# Patient Record
Sex: FEMALE | Race: BLACK OR AFRICAN AMERICAN | Employment: FULL TIME | URBAN - METROPOLITAN AREA
[De-identification: names, ages, dates, MRNs, and addresses within clinical notes are randomized per-mention and may not be internally consistent; named-entity substitution may affect disease eponyms.]

---

## 2023-08-26 LAB — URINE CULTURE: NORMAL

## 2023-09-01 LAB
ALANINE AMINOTRANSFERASE (SGPT) (U/L) IN SER/PLAS: 20 U/L (ref 7–45)
ALBUMIN (G/DL) IN SER/PLAS: 4.4 G/DL (ref 3.4–5)
ALKALINE PHOSPHATASE (U/L) IN SER/PLAS: 63 U/L (ref 33–136)
ANION GAP IN SER/PLAS: 14 MMOL/L (ref 10–20)
ASPARTATE AMINOTRANSFERASE (SGOT) (U/L) IN SER/PLAS: 19 U/L (ref 9–39)
BASOPHILS (10*3/UL) IN BLOOD BY AUTOMATED COUNT: 0.04 X10E9/L (ref 0–0.1)
BASOPHILS/100 LEUKOCYTES IN BLOOD BY AUTOMATED COUNT: 0.6 % (ref 0–2)
BILIRUBIN TOTAL (MG/DL) IN SER/PLAS: 0.7 MG/DL (ref 0–1.2)
CALCIDIOL (25 OH VITAMIN D3) (NG/ML) IN SER/PLAS: 82 NG/ML
CALCIUM (MG/DL) IN SER/PLAS: 10.1 MG/DL (ref 8.6–10.6)
CARBON DIOXIDE, TOTAL (MMOL/L) IN SER/PLAS: 25 MMOL/L (ref 21–32)
CHLORIDE (MMOL/L) IN SER/PLAS: 107 MMOL/L (ref 98–107)
CHOLESTEROL (MG/DL) IN SER/PLAS: 107 MG/DL (ref 0–199)
CHOLESTEROL IN HDL (MG/DL) IN SER/PLAS: 67.2 MG/DL
CHOLESTEROL/HDL RATIO: 1.6
COBALAMIN (VITAMIN B12) (PG/ML) IN SER/PLAS: 900 PG/ML (ref 211–911)
CREATININE (MG/DL) IN SER/PLAS: 1.03 MG/DL (ref 0.5–1.05)
EOSINOPHILS (10*3/UL) IN BLOOD BY AUTOMATED COUNT: 0.26 X10E9/L (ref 0–0.7)
EOSINOPHILS/100 LEUKOCYTES IN BLOOD BY AUTOMATED COUNT: 4.1 % (ref 0–6)
ERYTHROCYTE DISTRIBUTION WIDTH (RATIO) BY AUTOMATED COUNT: 11.5 % (ref 11.5–14.5)
ERYTHROCYTE MEAN CORPUSCULAR HEMOGLOBIN CONCENTRATION (G/DL) BY AUTOMATED: 33.2 G/DL (ref 32–36)
ERYTHROCYTE MEAN CORPUSCULAR VOLUME (FL) BY AUTOMATED COUNT: 92 FL (ref 80–100)
ERYTHROCYTES (10*6/UL) IN BLOOD BY AUTOMATED COUNT: 4.78 X10E12/L (ref 4–5.2)
GFR FEMALE: 61 ML/MIN/1.73M2
GLUCOSE (MG/DL) IN SER/PLAS: 88 MG/DL (ref 74–99)
HEMATOCRIT (%) IN BLOOD BY AUTOMATED COUNT: 44 % (ref 36–46)
HEMOGLOBIN (G/DL) IN BLOOD: 14.6 G/DL (ref 12–16)
IMMATURE GRANULOCYTES/100 LEUKOCYTES IN BLOOD BY AUTOMATED COUNT: 0.2 % (ref 0–0.9)
LDL: 24 MG/DL (ref 0–99)
LEUKOCYTES (10*3/UL) IN BLOOD BY AUTOMATED COUNT: 6.3 X10E9/L (ref 4.4–11.3)
LYMPHOCYTES (10*3/UL) IN BLOOD BY AUTOMATED COUNT: 2.26 X10E9/L (ref 1.2–4.8)
LYMPHOCYTES/100 LEUKOCYTES IN BLOOD BY AUTOMATED COUNT: 35.8 % (ref 13–44)
MONOCYTES (10*3/UL) IN BLOOD BY AUTOMATED COUNT: 0.34 X10E9/L (ref 0.1–1)
MONOCYTES/100 LEUKOCYTES IN BLOOD BY AUTOMATED COUNT: 5.4 % (ref 2–10)
NEUTROPHILS (10*3/UL) IN BLOOD BY AUTOMATED COUNT: 3.41 X10E9/L (ref 1.2–7.7)
NEUTROPHILS/100 LEUKOCYTES IN BLOOD BY AUTOMATED COUNT: 53.9 % (ref 40–80)
NRBC (PER 100 WBCS) BY AUTOMATED COUNT: 0 /100 WBC (ref 0–0)
PLATELETS (10*3/UL) IN BLOOD AUTOMATED COUNT: 257 X10E9/L (ref 150–450)
POTASSIUM (MMOL/L) IN SER/PLAS: 4 MMOL/L (ref 3.5–5.3)
PROTEIN TOTAL: 7.2 G/DL (ref 6.4–8.2)
SODIUM (MMOL/L) IN SER/PLAS: 142 MMOL/L (ref 136–145)
THYROTROPIN (MIU/L) IN SER/PLAS BY DETECTION LIMIT <= 0.05 MIU/L: 1.63 MIU/L (ref 0.44–3.98)
TRIGLYCERIDE (MG/DL) IN SER/PLAS: 79 MG/DL (ref 0–149)
UREA NITROGEN (MG/DL) IN SER/PLAS: 13 MG/DL (ref 6–23)
VLDL: 16 MG/DL (ref 0–40)

## 2023-09-06 LAB
CLUE CELLS: ABNORMAL
NUGENT SCORE: 4
URINE CULTURE: NORMAL
VAGINITIS-BV + YEAST INTERPRETATION: ABNORMAL
YEAST: ABNORMAL

## 2023-09-28 PROBLEM — H02.9 LESION OF LEFT EYELID: Status: ACTIVE | Noted: 2023-09-28

## 2023-09-28 PROBLEM — E55.9 VITAMIN D DEFICIENCY: Status: ACTIVE | Noted: 2023-09-28

## 2023-09-28 PROBLEM — R30.0 DYSURIA: Status: ACTIVE | Noted: 2023-09-28

## 2023-09-28 PROBLEM — R53.83 FATIGUE: Status: ACTIVE | Noted: 2023-09-28

## 2023-09-28 PROBLEM — R25.2 LEG CRAMPS: Status: ACTIVE | Noted: 2023-09-28

## 2023-09-28 PROBLEM — H52.4 MYOPIA OF BOTH EYES WITH ASTIGMATISM AND PRESBYOPIA: Status: ACTIVE | Noted: 2023-09-28

## 2023-09-28 PROBLEM — H43.813 PVD (POSTERIOR VITREOUS DETACHMENT), BOTH EYES: Status: ACTIVE | Noted: 2023-09-28

## 2023-09-28 PROBLEM — R94.31 ABNORMAL ECG: Status: ACTIVE | Noted: 2023-09-28

## 2023-09-28 PROBLEM — N95.1 VAGINAL DRYNESS, MENOPAUSAL: Status: ACTIVE | Noted: 2023-09-28

## 2023-09-28 PROBLEM — R35.0 FREQUENT URINATION: Status: ACTIVE | Noted: 2023-09-28

## 2023-09-28 PROBLEM — R20.0 NUMBNESS OF RIGHT FOOT: Status: ACTIVE | Noted: 2023-09-28

## 2023-09-28 PROBLEM — H25.811 COMBINED FORM OF AGE-RELATED CATARACT, RIGHT EYE: Status: ACTIVE | Noted: 2023-09-28

## 2023-09-28 PROBLEM — H52.13 MYOPIA OF BOTH EYES WITH ASTIGMATISM AND PRESBYOPIA: Status: ACTIVE | Noted: 2023-09-28

## 2023-09-28 PROBLEM — H25.812 COMBINED FORM OF AGE-RELATED CATARACT, LEFT EYE: Status: ACTIVE | Noted: 2023-09-28

## 2023-09-28 PROBLEM — I83.90 VARICOSE VEIN OF LEG: Status: ACTIVE | Noted: 2023-09-28

## 2023-09-28 PROBLEM — N95.1 HOT FLASHES, MENOPAUSAL: Status: ACTIVE | Noted: 2023-09-28

## 2023-09-28 PROBLEM — N83.00 OVARIAN CYST, FOLLICULAR: Status: ACTIVE | Noted: 2023-09-28

## 2023-09-28 PROBLEM — I10 HYPERTENSION: Status: ACTIVE | Noted: 2023-09-28

## 2023-09-28 PROBLEM — M25.473 ANKLE EDEMA: Status: ACTIVE | Noted: 2023-09-28

## 2023-09-28 PROBLEM — E61.1 IRON DEFICIENCY: Status: ACTIVE | Noted: 2023-09-28

## 2023-09-28 PROBLEM — H18.519 CORNEA GUTTATA: Status: ACTIVE | Noted: 2023-09-28

## 2023-09-28 PROBLEM — M81.0 OSTEOPOROSIS: Status: ACTIVE | Noted: 2023-09-28

## 2023-09-28 PROBLEM — D21.9 LEIOMYOMA: Status: ACTIVE | Noted: 2023-09-28

## 2023-09-28 PROBLEM — R01.1 HEART MURMUR: Status: ACTIVE | Noted: 2023-09-28

## 2023-09-28 PROBLEM — D64.9 ANEMIA: Status: ACTIVE | Noted: 2023-09-28

## 2023-09-28 PROBLEM — H52.203 MYOPIA OF BOTH EYES WITH ASTIGMATISM AND PRESBYOPIA: Status: ACTIVE | Noted: 2023-09-28

## 2023-09-28 PROBLEM — H04.123 DRY EYES, BILATERAL: Status: ACTIVE | Noted: 2023-09-28

## 2023-09-28 RX ORDER — MULTIVITAMIN
1 TABLET ORAL DAILY
COMMUNITY

## 2023-09-28 RX ORDER — ACETAMINOPHEN 500 MG
50 TABLET ORAL DAILY
COMMUNITY

## 2023-09-28 RX ORDER — ESTRADIOL 10 UG/1
10 INSERT VAGINAL 2 TIMES WEEKLY
COMMUNITY
End: 2023-11-14 | Stop reason: WASHOUT

## 2023-09-28 RX ORDER — TRIAMTERENE AND HYDROCHLOROTHIAZIDE 37.5; 25 MG/1; MG/1
1 CAPSULE ORAL DAILY
COMMUNITY
Start: 2016-10-12

## 2023-09-28 RX ORDER — AMLODIPINE AND BENAZEPRIL HYDROCHLORIDE 5; 10 MG/1; MG/1
1 CAPSULE ORAL DAILY
COMMUNITY
Start: 2018-10-30

## 2023-09-28 RX ORDER — IBUPROFEN 100 MG/5ML
1000 SUSPENSION, ORAL (FINAL DOSE FORM) ORAL DAILY
COMMUNITY

## 2023-10-09 RX ORDER — MELOXICAM 15 MG/1
15 TABLET ORAL DAILY
COMMUNITY
Start: 2023-01-09 | End: 2023-11-14 | Stop reason: WASHOUT

## 2023-10-09 NOTE — PROGRESS NOTES
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  She does not have time to exercise much, every now and then she will use the treadmill  She is not wearing compression stockings  BMI >30   No known liver, kidney, lung or heart disease   TSH WNL   Albumin WNL   She has fibroids, h/o ectopic pregnancy   No known prior pelvic -abdominal -legs surgeries, trauma or Infection   No prior h/o VTE  No h/o Malignancy  FH CVI  Review of Systems  Bilateral lower extremity varicose veins, that started more than 20 years ago after having an ectopic pregnancy  urinary symptoms   bloating   fingers/toes intermittent numbness/tingling   Infrequent SOB   Pt denies Legs ache/pain, heaviness or throbbing pain, no cramping, no burning pain while walking, itching, or skin discoloration/eczematous changes, no VV bleeding, ulcers, redness, hotness, tenderness  No lightheadedness, dizziness, syncope     Past Medical History:   Diagnosis Date    Anemia, unspecified 04/03/2014    Anemia    Essential (primary) hypertension 07/14/2022    Hypertension    Menopausal and female climacteric states 07/13/2021    Vaginal dryness, menopausal    Right upper quadrant pain     Abdominal pain, RUQ (right upper quadrant)     Past Surgical History:   Procedure Laterality Date    BREAST SURGERY  08/27/2013    Breast Surgery Reduction Procedure    OTHER SURGICAL HISTORY  06/16/2022    Ectopic pregnancy removal         Family History   Problem Relation Name Age of Onset    Rheum arthritis Mother      Other (stroke syndrome) Other        No Known  "Allergies    Objective   Physical Exam  There were no vitals taken for this visit.     General: Not in acute distress  Neuro: AA x3   Pulmonary: clear to auscultation   CVS: Regular HR  Abdomen: Soft, non-tender   Lower extremity: +2 DP/PT, no edema, stanton varicosities, visible/palpable VV, R>L, distal >proximal     Medications   Current Outpatient Medications on File Prior to Visit   Medication Sig Dispense Refill    amLODIPine-benazepriL (Lotrel) 5-10 mg capsule Take 1 capsule by mouth once daily.      ascorbic acid (Vitamin C) 1,000 mg tablet Take 1 tablet (1,000 mg) by mouth once daily.      cholecalciferol (Vitamin D-3) 50 mcg (2,000 unit) capsule Take 1 capsule (50 mcg) by mouth early in the morning..      estradiol (Yuvafem) 10 mcg tablet vaginal tablet Insert 1 tablet (10 mcg) into the vagina 2 times a week.      multivitamin tablet Take 1 tablet by mouth once daily.      triamterene-hydrochlorothiazid (Dyazide) 37.5-25 mg capsule Take 1 capsule by mouth once daily.       No current facility-administered medications on file prior to visit.       Lab Review   {recent labs:19471::\"not applicable\"}    PTT - No results in last year.  _   _ _     Lab Results   Component Value Date    CHOL 107 09/01/2023    CHOL 100 01/09/2023    CHOL 124 01/13/2022    HDL 67.2 09/01/2023    HDL 62.4 01/09/2023    HDL 70.1 01/13/2022    TRIG 79 09/01/2023    TRIG 76 01/09/2023    TRIG 108 01/13/2022        No results found for: \"HGBA1C\"     Imaging  No results found.    No results found for this or any previous visit from the past 1095 days.      Assessment/Plan   Layla Delgado is a 63 y.o. female, A 62 yoF, with PMhx as noted. Following with VM for VV      _CVI CEAP C2 VV   _Other possible contributing risk factors:   Lack of compression/exercise, standing most of the day given the nature of her work   Weight, fibroids, h/o ectopic pregnancy, FH CVI     Plan  -- Use 20-30 mmHg compression stockings, use during the day, can " remove for comfort during night  -- Pt was educated about possible CVI complications as VV bleeding, dermatitis, ulcers and thrombophlebitis   -- The rest as detailed in the patient's instructions above

## 2023-10-10 ENCOUNTER — APPOINTMENT (OUTPATIENT)
Dept: CARDIOLOGY | Facility: CLINIC | Age: 63
End: 2023-10-10
Payer: COMMERCIAL

## 2023-10-12 ENCOUNTER — ANCILLARY PROCEDURE (OUTPATIENT)
Dept: RADIOLOGY | Facility: CLINIC | Age: 63
End: 2023-10-12
Payer: COMMERCIAL

## 2023-10-12 DIAGNOSIS — Z12.39 ENCOUNTER FOR OTHER SCREENING FOR MALIGNANT NEOPLASM OF BREAST: ICD-10-CM

## 2023-10-12 PROCEDURE — 77067 SCR MAMMO BI INCL CAD: CPT | Mod: 50

## 2023-10-12 PROCEDURE — 77063 BREAST TOMOSYNTHESIS BI: CPT | Mod: BILATERAL PROCEDURE | Performed by: RADIOLOGY

## 2023-10-12 PROCEDURE — 77067 SCR MAMMO BI INCL CAD: CPT | Mod: BILATERAL PROCEDURE | Performed by: RADIOLOGY

## 2023-10-17 ENCOUNTER — HOSPITAL ENCOUNTER (OUTPATIENT)
Dept: RADIOLOGY | Facility: HOSPITAL | Age: 63
Discharge: HOME | End: 2023-10-17
Payer: COMMERCIAL

## 2023-10-17 DIAGNOSIS — I10 ESSENTIAL (PRIMARY) HYPERTENSION: ICD-10-CM

## 2023-10-17 PROCEDURE — 75571 CT HRT W/O DYE W/CA TEST: CPT

## 2023-11-14 ENCOUNTER — OFFICE VISIT (OUTPATIENT)
Dept: CARDIOLOGY | Facility: CLINIC | Age: 63
End: 2023-11-14
Payer: COMMERCIAL

## 2023-11-14 VITALS
SYSTOLIC BLOOD PRESSURE: 169 MMHG | WEIGHT: 177.1 LBS | HEART RATE: 64 BPM | HEIGHT: 63 IN | OXYGEN SATURATION: 97 % | BODY MASS INDEX: 31.38 KG/M2 | DIASTOLIC BLOOD PRESSURE: 100 MMHG

## 2023-11-14 DIAGNOSIS — I87.2 CHRONIC VENOUS INSUFFICIENCY: Primary | ICD-10-CM

## 2023-11-14 PROCEDURE — 99214 OFFICE O/P EST MOD 30 MIN: CPT | Performed by: INTERNAL MEDICINE

## 2023-11-14 PROCEDURE — 3077F SYST BP >= 140 MM HG: CPT | Performed by: INTERNAL MEDICINE

## 2023-11-14 PROCEDURE — 3080F DIAST BP >= 90 MM HG: CPT | Performed by: INTERNAL MEDICINE

## 2023-11-14 PROCEDURE — 1036F TOBACCO NON-USER: CPT | Performed by: INTERNAL MEDICINE

## 2023-11-14 ASSESSMENT — ENCOUNTER SYMPTOMS
OCCASIONAL FEELINGS OF UNSTEADINESS: 0
DEPRESSION: 0
LOSS OF SENSATION IN FEET: 0

## 2023-11-14 ASSESSMENT — COLUMBIA-SUICIDE SEVERITY RATING SCALE - C-SSRS
2. HAVE YOU ACTUALLY HAD ANY THOUGHTS OF KILLING YOURSELF?: NO
1. IN THE PAST MONTH, HAVE YOU WISHED YOU WERE DEAD OR WISHED YOU COULD GO TO SLEEP AND NOT WAKE UP?: NO
6. HAVE YOU EVER DONE ANYTHING, STARTED TO DO ANYTHING, OR PREPARED TO DO ANYTHING TO END YOUR LIFE?: NO

## 2023-11-14 ASSESSMENT — PATIENT HEALTH QUESTIONNAIRE - PHQ9
2. FEELING DOWN, DEPRESSED OR HOPELESS: NOT AT ALL
SUM OF ALL RESPONSES TO PHQ9 QUESTIONS 1 AND 2: 0
1. LITTLE INTEREST OR PLEASURE IN DOING THINGS: NOT AT ALL

## 2023-11-14 ASSESSMENT — PAIN SCALES - GENERAL: PAINLEVEL: 0-NO PAIN

## 2023-11-14 NOTE — PROGRESS NOTES
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  She is trying to walk more during her shifts  She continues to complain of varicose veins associated with ankle swelling, leg heaviness and discomfort at the end of the day, she also started to notice asymptomatic discoloration and the left gaiter area     Review of Systems  Bilateral lower extremity varicose veins, that started more than 20 years ago after having an ectopic pregnancy and has been slowly progressive associated with mild intermittent right ankle swelling  urinary symptoms   bloating   fingers/toes intermittent numbness/tingling   Infrequent SOB   Pt denies Legs ache/pain, no cramping, no itching, or skin eczematous changes, no VV bleeding, ulcers, redness, hotness, tenderness  No lightheadedness, dizziness, syncope     Past Medical History:   Diagnosis Date    Anemia, unspecified 04/03/2014    Anemia    Essential (primary) hypertension 07/14/2022    Hypertension    Menopausal and female climacteric states 07/13/2021    Vaginal dryness, menopausal    Right upper quadrant pain     Abdominal pain, RUQ (right upper quadrant)     Past Surgical History:   Procedure Laterality Date    BREAST SURGERY  08/27/2013    Breast Surgery Reduction Procedure    OTHER SURGICAL HISTORY  06/16/2022    Ectopic pregnancy removal     Social History     Tobacco Use    Smoking status: Never    Smokeless tobacco: Never   Substance Use Topics    Alcohol use: Yes     Comment: social    Drug use: Never      Family History   Problem Relation Name Age of Onset    Rheum arthritis Mother      Other (stroke syndrome) Other        No Known Allergies    Objective   Physical Exam  BP (!) 169/100 (BP Location: Right arm, Patient Position: Sitting, BP Cuff Size: Large adult) Comment: didn't take blood pressure meds  Pulse 64   Ht 1.6 m (5' 3\")   Wt 80.3 kg (177 lb 1.6 oz)   BMI 31.37 kg/m²      General:  In " "no acute distress  Neuro: alert and oriented x3  CV:  RRR  Lungs: CTA bilaterally  Abd:  Soft, non-tender   Psych:  Appropriate affect  Upper extremities: No swelling, +2 radial   Lower extremity: +2 DP/PT, no edema, stanton varicosities, visible/palpable VV, R>L, distal>proximal,  left gaiter area mild discoloration/no tenderness or itching/No open wounds     Medications   Current Outpatient Medications on File Prior to Visit   Medication Sig Dispense Refill    amLODIPine-benazepriL (Lotrel) 5-10 mg capsule Take 1 capsule by mouth once daily.      ascorbic acid (Vitamin C) 1,000 mg tablet Take 1 tablet (1,000 mg) by mouth once daily.      cholecalciferol (Vitamin D-3) 50 mcg (2,000 unit) capsule Take 1 capsule (50 mcg) by mouth early in the morning..      multivitamin tablet Take 1 tablet by mouth once daily.      triamterene-hydrochlorothiazid (Dyazide) 37.5-25 mg capsule Take 1 capsule by mouth once daily.      [DISCONTINUED] estradiol (Yuvafem) 10 mcg tablet vaginal tablet Insert 1 tablet (10 mcg) into the vagina 2 times a week.      [DISCONTINUED] meloxicam (Mobic) 15 mg tablet Take 1 tablet (15 mg) by mouth once daily.       No current facility-administered medications on file prior to visit.       Lab Review   Lab Results   Component Value Date     09/01/2023    K 4.0 09/01/2023     09/01/2023    CO2 25 09/01/2023    BUN 13 09/01/2023    CREATININE 1.03 09/01/2023    GLUCOSE 88 09/01/2023    CALCIUM 10.1 09/01/2023     Lab Results   Component Value Date    WBC 6.3 09/01/2023    HGB 14.6 09/01/2023    HCT 44.0 09/01/2023    MCV 92 09/01/2023     09/01/2023       PTT - No results in last year.  _   _ _     Lab Results   Component Value Date    CHOL 107 09/01/2023    CHOL 100 01/09/2023    CHOL 124 01/13/2022    HDL 67.2 09/01/2023    HDL 62.4 01/09/2023    HDL 70.1 01/13/2022    TRIG 79 09/01/2023    TRIG 76 01/09/2023    TRIG 108 01/13/2022       No results found for: \"HGBA1C\"    Imaging  N/A "     Assessment/Plan   Layla Delgado is a 63 y.o. female, with PMhx as noted. 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

## 2023-11-14 NOTE — PATIENT INSTRUCTIONS
-- Meticulous skin care:   Wash daily with mild soap and warm water   Daily use of emollient moisturizer (ex. Jia, Aquaphor, Eucerin)  -- Legs should be elevated whenever you are sleeping/lying in bed or sitting with 1-2 pillows below the legs in order to try to keep them above the level of heart  -- Exercise encouraged (at least 30 mins, 5 times a week, cyclic dependent thrusting of legs recommended to activate calf muscle pump)  -- Please try to follow a Low salt diet  -- Weight loss highly encouraged, target BMI <25  -- Close monitoring for signs of infection/wounds   -- Follow up in 6-8 weeks, earlier if needed

## 2024-01-02 ENCOUNTER — APPOINTMENT (OUTPATIENT)
Dept: CARDIOLOGY | Facility: CLINIC | Age: 64
End: 2024-01-02
Payer: COMMERCIAL

## 2024-01-11 ENCOUNTER — APPOINTMENT (OUTPATIENT)
Dept: RADIOLOGY | Facility: CLINIC | Age: 64
End: 2024-01-11
Payer: COMMERCIAL

## 2024-02-05 ENCOUNTER — TELEPHONE (OUTPATIENT)
Dept: OBSTETRICS AND GYNECOLOGY | Facility: CLINIC | Age: 64
End: 2024-02-05
Payer: COMMERCIAL

## 2024-02-05 DIAGNOSIS — R30.0 DYSURIA: Primary | ICD-10-CM

## 2024-02-05 NOTE — TELEPHONE ENCOUNTER
Pt is  having irritation around her urethra, its really dry and irritated in that area. She is having uti symptoms as well. She has been drinking water and cranberry juice. Taking otc azo and cranberry. she would like a medication called in for this. Please advise thank you very much

## 2024-02-06 RX ORDER — CIPROFLOXACIN 500 MG/1
500 TABLET ORAL 2 TIMES DAILY
Qty: 20 TABLET | Refills: 0 | Status: SHIPPED | OUTPATIENT
Start: 2024-02-06 | End: 2024-02-16

## 2024-03-25 ENCOUNTER — HOSPITAL ENCOUNTER (OUTPATIENT)
Dept: RADIOLOGY | Facility: CLINIC | Age: 64
Discharge: HOME | End: 2024-03-25
Payer: COMMERCIAL

## 2024-03-25 DIAGNOSIS — M81.0 AGE-RELATED OSTEOPOROSIS WITHOUT CURRENT PATHOLOGICAL FRACTURE: ICD-10-CM

## 2024-03-25 PROCEDURE — 77080 DXA BONE DENSITY AXIAL: CPT

## 2024-04-16 ENCOUNTER — OFFICE VISIT (OUTPATIENT)
Dept: OBSTETRICS AND GYNECOLOGY | Facility: CLINIC | Age: 64
End: 2024-04-16
Payer: COMMERCIAL

## 2024-04-16 VITALS
DIASTOLIC BLOOD PRESSURE: 84 MMHG | SYSTOLIC BLOOD PRESSURE: 122 MMHG | WEIGHT: 173 LBS | BODY MASS INDEX: 30.65 KG/M2 | HEIGHT: 63 IN

## 2024-04-16 DIAGNOSIS — N89.8 VAGINAL DRYNESS: Primary | ICD-10-CM

## 2024-04-16 PROCEDURE — 99214 OFFICE O/P EST MOD 30 MIN: CPT | Performed by: OBSTETRICS & GYNECOLOGY

## 2024-04-16 PROCEDURE — 3079F DIAST BP 80-89 MM HG: CPT | Performed by: OBSTETRICS & GYNECOLOGY

## 2024-04-16 PROCEDURE — 1036F TOBACCO NON-USER: CPT | Performed by: OBSTETRICS & GYNECOLOGY

## 2024-04-16 PROCEDURE — 3074F SYST BP LT 130 MM HG: CPT | Performed by: OBSTETRICS & GYNECOLOGY

## 2024-04-16 NOTE — PROGRESS NOTES
Layla Delgado is a 63 y.o. female who presents with a chief complaint of Follow-up (Ultrasound results )      SUBJECTIVE  Patient presents complaining of vaginal dryness.  She has been having this for some time since she became menopausal.  She is also having some irritation around her urethra which was attributed to her vaginal dryness.  She currently is negotiating getting sexually active again and I wanted to talk about this.  We talked about using her vaginal estrogen that was prescribed.  She currently has it stockpiled at her house but she has not been using it.  We went over the women's health study and complications and contraindications to vaginal estrogen.    Past Medical History:   Diagnosis Date    Anemia, unspecified 04/03/2014    Anemia    Essential (primary) hypertension 07/14/2022    Hypertension    Menopausal and female climacteric states 07/13/2021    Vaginal dryness, menopausal    Right upper quadrant pain     Abdominal pain, RUQ (right upper quadrant)     Past Surgical History:   Procedure Laterality Date    BREAST SURGERY  08/27/2013    Breast Surgery Reduction Procedure    OTHER SURGICAL HISTORY  06/16/2022    Ectopic pregnancy removal     Social History     Socioeconomic History    Marital status: Single     Spouse name: None    Number of children: None    Years of education: None    Highest education level: None   Occupational History    None   Tobacco Use    Smoking status: Never    Smokeless tobacco: Never   Vaping Use    Vaping status: Never Used   Substance and Sexual Activity    Alcohol use: Yes     Comment: social    Drug use: Never    Sexual activity: Not Currently   Other Topics Concern    None   Social History Narrative    None     Social Determinants of Health     Financial Resource Strain: Not on file   Food Insecurity: Not on file   Transportation Needs: Not on file   Physical Activity: Not on file   Stress: Not on file   Social Connections: Not on file   Intimate Partner  "Violence: Not on file   Housing Stability: Not on file     Family History   Problem Relation Name Age of Onset    Rheum arthritis Mother      Other (stroke syndrome) Other         OB History   No obstetric history on file.       OBJECTIVE  No Known Allergies   (Not in a hospital admission)       Review of Systems  History obtained from the patient  General ROS: negative  Psychological ROS: negative  Gastrointestinal ROS: negative  Musculoskeletal ROS: negative  Physical Exam  General Appearance: awake, alert, oriented, in no acute distress, well developed, well nourished, and in no acute distress  Skin: there are no suspicious lesions or rashes of concern, skin color, texture, turgor are normal; there are no bruises, rashes or lesions.  Head/Face: NCAT  Eyes: No gross abnormalities., PERRL, and EOMI  Neck: neck- supple, no mass, non-tender  Back: no pain to palpation  Abdomen: Soft, non-tender, normal bowel sounds; no bruits, organomegaly or masses.  Extremities: Extremities warm to touch, pink, with no edema.  Musculoskeletal: negative    /84   Ht 1.6 m (5' 3\")   Wt 78.5 kg (173 lb)   BMI 30.65 kg/m²    Problem List Items Addressed This Visit    None  Visit Diagnoses       Vaginal dryness    -  Primary         Restart vaginal estrogen      "

## 2024-07-16 ENCOUNTER — APPOINTMENT (OUTPATIENT)
Dept: OBSTETRICS AND GYNECOLOGY | Facility: CLINIC | Age: 64
End: 2024-07-16
Payer: COMMERCIAL

## 2024-07-16 VITALS
DIASTOLIC BLOOD PRESSURE: 90 MMHG | SYSTOLIC BLOOD PRESSURE: 132 MMHG | HEIGHT: 63 IN | BODY MASS INDEX: 30.12 KG/M2 | WEIGHT: 170 LBS

## 2024-07-16 DIAGNOSIS — R30.0 DYSURIA: Primary | ICD-10-CM

## 2024-07-16 PROCEDURE — 3075F SYST BP GE 130 - 139MM HG: CPT | Performed by: OBSTETRICS & GYNECOLOGY

## 2024-07-16 PROCEDURE — 1036F TOBACCO NON-USER: CPT | Performed by: OBSTETRICS & GYNECOLOGY

## 2024-07-16 PROCEDURE — 99214 OFFICE O/P EST MOD 30 MIN: CPT | Performed by: OBSTETRICS & GYNECOLOGY

## 2024-07-16 PROCEDURE — 87086 URINE CULTURE/COLONY COUNT: CPT

## 2024-07-16 PROCEDURE — 87205 SMEAR GRAM STAIN: CPT

## 2024-07-16 PROCEDURE — 3080F DIAST BP >= 90 MM HG: CPT | Performed by: OBSTETRICS & GYNECOLOGY

## 2024-07-16 NOTE — PROGRESS NOTES
Layla Delgado is a 63 y.o. female who presents with a chief complaint of Follow-up      SUBJECTIVE  Patient presents with continued itching around her urethra.  This has been going on for some time.  She was using estrogen vaginal cream which has not helped.  She went online to her online side and got nitrofurantoin and is using Azo which has not been helping.  She also is using some pH cream in her vagina to try to help the pH but now she has a vaginal odor to go along with it.  She has been doing a lot of reading about this urethritis and its bothering her.  We discussed all this at length    Past Medical History:   Diagnosis Date    Anemia, unspecified 04/03/2014    Anemia    Essential (primary) hypertension 07/14/2022    Hypertension    Menopausal and female climacteric states 07/13/2021    Vaginal dryness, menopausal    Right upper quadrant pain     Abdominal pain, RUQ (right upper quadrant)     Past Surgical History:   Procedure Laterality Date    BREAST SURGERY  08/27/2013    Breast Surgery Reduction Procedure    OTHER SURGICAL HISTORY  06/16/2022    Ectopic pregnancy removal     Social History     Socioeconomic History    Marital status: Single   Tobacco Use    Smoking status: Never    Smokeless tobacco: Never   Vaping Use    Vaping status: Never Used   Substance and Sexual Activity    Alcohol use: Yes     Comment: social    Drug use: Never    Sexual activity: Not Currently     Family History   Problem Relation Name Age of Onset    Rheum arthritis Mother      Other (stroke syndrome) Other         OB History   No obstetric history on file.       OBJECTIVE  No Known Allergies   (Not in a hospital admission)       Review of Systems  History obtained from the patient  General ROS: negative  Psychological ROS: negative  Gastrointestinal ROS: negative  Musculoskeletal ROS: negative  Physical Exam  General Appearance: awake, alert, oriented, in no acute distress, well developed, well nourished, and in no acute  "distress  Skin: there are no suspicious lesions or rashes of concern, skin color, texture, turgor are normal; there are no bruises, rashes or lesions.  Head/Face: NCAT  Eyes: No gross abnormalities., PERRL, and EOMI  Neck: neck- supple, no mass, non-tender  Back: no pain to palpation  Abdomen: Soft, non-tender, normal bowel sounds; no bruits, organomegaly or masses.  Extremities: Extremities warm to touch, pink, with no edema.  Musculoskeletal: negative  Urogen: External genitalia: Normal, Vagina: Normal, Cervix: Normal, and Bimanual exam: Normal    /90   Ht 1.6 m (5' 3\")   Wt 77.1 kg (170 lb)   BMI 30.11 kg/m²    Problem List Items Addressed This Visit       Dysuria - Primary    Relevant Orders    Vaginitis Gram Stain For Bacterial Vaginosis + Yeast    Urine culture   Discussed referral to urogynecology if the workup is negative..      "

## 2024-07-17 LAB
BACTERIA UR CULT: NORMAL
CLUE CELLS VAG LPF-#/AREA: NORMAL /[LPF]
NUGENT SCORE: 0
YEAST VAG WET PREP-#/AREA: NORMAL

## 2024-09-03 ENCOUNTER — HOSPITAL ENCOUNTER (OUTPATIENT)
Dept: RADIOLOGY | Facility: CLINIC | Age: 64
Discharge: HOME | End: 2024-09-03
Payer: COMMERCIAL

## 2024-09-03 DIAGNOSIS — S89.91XA RIGHT KNEE INJURY, INITIAL ENCOUNTER: ICD-10-CM

## 2024-09-03 DIAGNOSIS — S89.92XA LEFT KNEE INJURY, INITIAL ENCOUNTER: ICD-10-CM

## 2024-09-03 PROCEDURE — 73564 X-RAY EXAM KNEE 4 OR MORE: CPT | Mod: RIGHT SIDE | Performed by: RADIOLOGY

## 2024-09-03 PROCEDURE — 73564 X-RAY EXAM KNEE 4 OR MORE: CPT | Mod: RT

## 2024-09-03 PROCEDURE — 73564 X-RAY EXAM KNEE 4 OR MORE: CPT | Mod: LT

## 2024-09-03 PROCEDURE — 73564 X-RAY EXAM KNEE 4 OR MORE: CPT | Mod: LEFT SIDE | Performed by: RADIOLOGY

## 2024-10-23 DIAGNOSIS — I10 PRIMARY HYPERTENSION: Primary | ICD-10-CM

## 2024-10-24 RX ORDER — AMLODIPINE AND BENAZEPRIL HYDROCHLORIDE 5; 10 MG/1; MG/1
1 CAPSULE ORAL DAILY
Qty: 30 CAPSULE | Refills: 0 | Status: SHIPPED | OUTPATIENT
Start: 2024-10-24

## 2024-10-24 RX ORDER — TRIAMTERENE AND HYDROCHLOROTHIAZIDE 37.5; 25 MG/1; MG/1
1 CAPSULE ORAL DAILY
Qty: 30 CAPSULE | Refills: 0 | Status: SHIPPED | OUTPATIENT
Start: 2024-10-24 | End: 2024-11-23

## 2024-11-07 DIAGNOSIS — N89.8 VAGINAL DRYNESS: Primary | ICD-10-CM

## 2024-11-07 DIAGNOSIS — N89.8 VAGINAL DRYNESS: ICD-10-CM

## 2024-11-07 RX ORDER — ESTRADIOL 10 UG/1
TABLET VAGINAL
COMMUNITY
Start: 2024-04-27 | End: 2024-11-07 | Stop reason: SDUPTHER

## 2024-11-07 RX ORDER — ESTRADIOL 10 UG/1
10 TABLET VAGINAL 2 TIMES WEEKLY
Qty: 24 TABLET | Refills: 3 | Status: SHIPPED | OUTPATIENT
Start: 2024-11-07 | End: 2024-11-08

## 2024-11-07 RX ORDER — ESTRADIOL 10 UG/1
10 TABLET VAGINAL 2 TIMES WEEKLY
Qty: 24 TABLET | Refills: 3 | Status: SHIPPED | OUTPATIENT
Start: 2024-11-07 | End: 2024-11-07

## 2024-11-08 DIAGNOSIS — N89.8 VAGINAL DRYNESS: ICD-10-CM

## 2024-11-08 RX ORDER — ESTRADIOL 10 UG/1
10 TABLET VAGINAL 2 TIMES WEEKLY
Qty: 24 TABLET | Refills: 3 | Status: SHIPPED | OUTPATIENT
Start: 2024-11-11 | End: 2024-11-08

## 2024-11-08 RX ORDER — ESTRADIOL 10 UG/1
10 TABLET VAGINAL 2 TIMES WEEKLY
Qty: 24 TABLET | Refills: 3 | Status: SHIPPED | OUTPATIENT
Start: 2024-11-11 | End: 2025-11-11

## 2024-11-11 RX ORDER — ESTRADIOL 10 UG/1
10 TABLET VAGINAL 2 TIMES WEEKLY
Qty: 24 TABLET | Refills: 3 | Status: SHIPPED | OUTPATIENT
Start: 2024-11-11 | End: 2025-11-11

## 2024-11-25 DIAGNOSIS — I10 PRIMARY HYPERTENSION: ICD-10-CM

## 2024-11-25 RX ORDER — AMLODIPINE AND BENAZEPRIL HYDROCHLORIDE 5; 10 MG/1; MG/1
1 CAPSULE ORAL DAILY
Qty: 90 CAPSULE | Refills: 1 | Status: SHIPPED | OUTPATIENT
Start: 2024-11-25

## 2024-11-25 RX ORDER — TRIAMTERENE AND HYDROCHLOROTHIAZIDE 37.5; 25 MG/1; MG/1
1 CAPSULE ORAL DAILY
Qty: 90 CAPSULE | Refills: 1 | Status: SHIPPED | OUTPATIENT
Start: 2024-11-25

## 2024-12-10 ENCOUNTER — APPOINTMENT (OUTPATIENT)
Dept: PRIMARY CARE | Facility: CLINIC | Age: 64
End: 2024-12-10
Payer: COMMERCIAL

## 2025-03-19 ENCOUNTER — APPOINTMENT (OUTPATIENT)
Dept: PRIMARY CARE | Facility: CLINIC | Age: 65
End: 2025-03-19
Payer: COMMERCIAL

## 2025-03-19 VITALS
DIASTOLIC BLOOD PRESSURE: 84 MMHG | BODY MASS INDEX: 30.11 KG/M2 | HEART RATE: 76 BPM | OXYGEN SATURATION: 96 % | WEIGHT: 169.97 LBS | SYSTOLIC BLOOD PRESSURE: 135 MMHG | RESPIRATION RATE: 16 BRPM

## 2025-03-19 DIAGNOSIS — I10 PRIMARY HYPERTENSION: ICD-10-CM

## 2025-03-19 DIAGNOSIS — Z12.11 SCREENING FOR COLORECTAL CANCER: ICD-10-CM

## 2025-03-19 DIAGNOSIS — D64.9 ANEMIA, UNSPECIFIED TYPE: ICD-10-CM

## 2025-03-19 DIAGNOSIS — Z00.00 ENCOUNTER FOR ANNUAL PHYSICAL EXAM: Primary | ICD-10-CM

## 2025-03-19 DIAGNOSIS — Z12.31 ENCOUNTER FOR SCREENING MAMMOGRAM FOR BREAST CANCER: ICD-10-CM

## 2025-03-19 DIAGNOSIS — R79.89 HIGH SERUM LOW-DENSITY LIPOPROTEIN (LDL): ICD-10-CM

## 2025-03-19 DIAGNOSIS — E55.9 VITAMIN D DEFICIENCY: ICD-10-CM

## 2025-03-19 DIAGNOSIS — Z12.12 SCREENING FOR COLORECTAL CANCER: ICD-10-CM

## 2025-03-19 PROBLEM — J20.9 ACUTE BRONCHITIS: Status: RESOLVED | Noted: 2025-03-19 | Resolved: 2025-03-19

## 2025-03-19 PROBLEM — R05.8 NONPRODUCTIVE COUGH: Status: RESOLVED | Noted: 2025-03-19 | Resolved: 2025-03-19

## 2025-03-19 PROBLEM — H43.819 POSTERIOR VITREOUS DETACHMENT: Status: ACTIVE | Noted: 2022-03-01

## 2025-03-19 PROBLEM — J01.00 ACUTE MAXILLARY SINUSITIS: Status: RESOLVED | Noted: 2025-03-19 | Resolved: 2025-03-19

## 2025-03-19 PROBLEM — B37.49 RENAL TRACT CANDIDIASIS: Status: ACTIVE | Noted: 2025-03-19

## 2025-03-19 PROCEDURE — 1160F RVW MEDS BY RX/DR IN RCRD: CPT | Performed by: INTERNAL MEDICINE

## 2025-03-19 PROCEDURE — 1159F MED LIST DOCD IN RCRD: CPT | Performed by: INTERNAL MEDICINE

## 2025-03-19 PROCEDURE — 3079F DIAST BP 80-89 MM HG: CPT | Performed by: INTERNAL MEDICINE

## 2025-03-19 PROCEDURE — 99397 PER PM REEVAL EST PAT 65+ YR: CPT | Performed by: INTERNAL MEDICINE

## 2025-03-19 PROCEDURE — 3075F SYST BP GE 130 - 139MM HG: CPT | Performed by: INTERNAL MEDICINE

## 2025-03-19 PROCEDURE — 1124F ACP DISCUSS-NO DSCNMKR DOCD: CPT | Performed by: INTERNAL MEDICINE

## 2025-03-19 PROCEDURE — 1036F TOBACCO NON-USER: CPT | Performed by: INTERNAL MEDICINE

## 2025-03-19 PROCEDURE — 1126F AMNT PAIN NOTED NONE PRSNT: CPT | Performed by: INTERNAL MEDICINE

## 2025-03-19 ASSESSMENT — PATIENT HEALTH QUESTIONNAIRE - PHQ9
SUM OF ALL RESPONSES TO PHQ9 QUESTIONS 1 AND 2: 0
2. FEELING DOWN, DEPRESSED OR HOPELESS: NOT AT ALL
1. LITTLE INTEREST OR PLEASURE IN DOING THINGS: NOT AT ALL

## 2025-03-19 ASSESSMENT — ENCOUNTER SYMPTOMS
DEPRESSION: 0
LOSS OF SENSATION IN FEET: 0
OCCASIONAL FEELINGS OF UNSTEADINESS: 0

## 2025-03-19 ASSESSMENT — PAIN SCALES - GENERAL: PAINLEVEL_OUTOF10: 0-NO PAIN

## 2025-03-19 NOTE — ASSESSMENT & PLAN NOTE
Advised to have Shingrix vaccine at the local pharmacy.  Last screening mammogram October 2023 scheduled to have screening mammogram this year.  Last bone density scan March 2024.  Last Cologuard June 2022.  Referral made to have Cologuard this year.  She has seen gynecologist in 2024.  Advised to follow healthy diet avoid animal fats fried foods fast food processed meats.  Do not exceed sweets.  Avoid dehydration.  Keep active physically exercise regularly.  Follow-up in 6 months

## 2025-03-19 NOTE — PROGRESS NOTES
Subjective   Patient ID: Layla Delgado is a 64 y.o. female who presents for Annual Exam.    She has hypertension annual physical exam.  He is compliant taking amlodipine-benazepril.  Complains of pain in her lower extremities feet her work requires standing, walking 8 to 10 hours.  Recommended to have cataract surgery.         Review of Systems   Cardiovascular:         Varicose veins   Musculoskeletal:         Feet pain.   All other systems reviewed and are negative.      Objective   /84 (BP Location: Left arm, Patient Position: Sitting)   Pulse 76   Resp 16   Wt 77.1 kg (169 lb 15.6 oz)   SpO2 96%   BMI 30.11 kg/m²     Physical Exam  Constitutional:       Appearance: Normal appearance.   HENT:      Head: Normocephalic.      Right Ear: Tympanic membrane and ear canal normal.      Left Ear: Tympanic membrane and ear canal normal.      Mouth/Throat:      Mouth: Mucous membranes are moist.      Pharynx: Oropharynx is clear.   Eyes:      Extraocular Movements: Extraocular movements intact.   Cardiovascular:      Rate and Rhythm: Regular rhythm.   Pulmonary:      Breath sounds: Normal breath sounds.   Chest:   Breasts:     Right: Normal.      Left: Normal.      Comments: Scars from breast reduction surgery  Abdominal:      Palpations: Abdomen is soft.   Musculoskeletal:         General: Normal range of motion.      Cervical back: Normal range of motion.   Skin:     General: Skin is warm.   Neurological:      Mental Status: She is oriented to person, place, and time.   Psychiatric:         Behavior: Behavior normal.         Assessment/Plan   Problem List Items Addressed This Visit             ICD-10-CM    Anemia D64.9    Relevant Orders    CBC and Auto Differential    Iron and TIBC    Vitamin B12    Hypertension I10    Relevant Orders    Comprehensive metabolic panel    Vitamin D deficiency E55.9    Relevant Orders    Vitamin D 25-Hydroxy,Total (for eval of Vitamin D levels)    Encounter for annual  physical exam - Primary Z00.00     Advised to have Shingrix vaccine at the local pharmacy.  Last screening mammogram October 2023 scheduled to have screening mammogram this year.  Last bone density scan March 2024.  Last Cologuard June 2022.  Referral made to have Cologuard this year.  She has seen gynecologist in 2024.  Advised to follow healthy diet avoid animal fats fried foods fast food processed meats.  Do not exceed sweets.  Avoid dehydration.  Keep active physically exercise regularly.  Follow-up in 6 months          Other Visit Diagnoses         Codes    Encounter for screening mammogram for breast cancer     Z12.31    Relevant Orders    BI mammo bilateral screening tomosynthesis    Screening for colorectal cancer     Z12.11, Z12.12    Relevant Orders    Cologuard® colon cancer screening    High serum low-density lipoprotein (LDL)     R79.89    Relevant Orders    Lipid panel

## 2025-03-20 LAB
25(OH)D3+25(OH)D2 SERPL-MCNC: 56 NG/ML (ref 30–100)
ALBUMIN SERPL-MCNC: 4.6 G/DL (ref 3.6–5.1)
ALP SERPL-CCNC: 59 U/L (ref 37–153)
ALT SERPL-CCNC: 13 U/L (ref 6–29)
ANION GAP SERPL CALCULATED.4IONS-SCNC: 9 MMOL/L (CALC) (ref 7–17)
AST SERPL-CCNC: 14 U/L (ref 10–35)
BASOPHILS # BLD AUTO: 53 CELLS/UL (ref 0–200)
BASOPHILS NFR BLD AUTO: 0.7 %
BILIRUB SERPL-MCNC: 0.4 MG/DL (ref 0.2–1.2)
BUN SERPL-MCNC: 12 MG/DL (ref 7–25)
CALCIUM SERPL-MCNC: 9.9 MG/DL (ref 8.6–10.4)
CHLORIDE SERPL-SCNC: 105 MMOL/L (ref 98–110)
CHOLEST SERPL-MCNC: 114 MG/DL
CHOLEST/HDLC SERPL: 1.5 (CALC)
CO2 SERPL-SCNC: 25 MMOL/L (ref 20–32)
CREAT SERPL-MCNC: 1.02 MG/DL (ref 0.5–1.05)
EGFRCR SERPLBLD CKD-EPI 2021: 61 ML/MIN/1.73M2
EOSINOPHIL # BLD AUTO: 258 CELLS/UL (ref 15–500)
EOSINOPHIL NFR BLD AUTO: 3.4 %
ERYTHROCYTE [DISTWIDTH] IN BLOOD BY AUTOMATED COUNT: 11.7 % (ref 11–15)
GLUCOSE SERPL-MCNC: 94 MG/DL (ref 65–139)
HCT VFR BLD AUTO: 45.8 % (ref 35–45)
HDLC SERPL-MCNC: 75 MG/DL
HGB BLD-MCNC: 15.4 G/DL (ref 11.7–15.5)
IRON SATN MFR SERPL: 28 % (CALC) (ref 16–45)
IRON SERPL-MCNC: 84 MCG/DL (ref 45–160)
LDLC SERPL CALC-MCNC: 21 MG/DL (CALC)
LYMPHOCYTES # BLD AUTO: 2880 CELLS/UL (ref 850–3900)
LYMPHOCYTES NFR BLD AUTO: 37.9 %
MCH RBC QN AUTO: 30.7 PG (ref 27–33)
MCHC RBC AUTO-ENTMCNC: 33.6 G/DL (ref 32–36)
MCV RBC AUTO: 91.4 FL (ref 80–100)
MONOCYTES # BLD AUTO: 479 CELLS/UL (ref 200–950)
MONOCYTES NFR BLD AUTO: 6.3 %
NEUTROPHILS # BLD AUTO: 3929 CELLS/UL (ref 1500–7800)
NEUTROPHILS NFR BLD AUTO: 51.7 %
NONHDLC SERPL-MCNC: 39 MG/DL (CALC)
PLATELET # BLD AUTO: 328 THOUSAND/UL (ref 140–400)
PMV BLD REES-ECKER: 10.2 FL (ref 7.5–12.5)
POTASSIUM SERPL-SCNC: 4.3 MMOL/L (ref 3.5–5.3)
PROT SERPL-MCNC: 7.5 G/DL (ref 6.1–8.1)
RBC # BLD AUTO: 5.01 MILLION/UL (ref 3.8–5.1)
SODIUM SERPL-SCNC: 139 MMOL/L (ref 135–146)
TIBC SERPL-MCNC: 297 MCG/DL (CALC) (ref 250–450)
TRIGL SERPL-MCNC: 95 MG/DL
VIT B12 SERPL-MCNC: 731 PG/ML (ref 200–1100)
WBC # BLD AUTO: 7.6 THOUSAND/UL (ref 3.8–10.8)

## 2025-04-23 ENCOUNTER — HOSPITAL ENCOUNTER (OUTPATIENT)
Dept: RADIOLOGY | Facility: CLINIC | Age: 65
Discharge: HOME | End: 2025-04-23
Payer: COMMERCIAL

## 2025-04-23 VITALS — BODY MASS INDEX: 29.95 KG/M2 | WEIGHT: 169 LBS | HEIGHT: 63 IN

## 2025-04-23 DIAGNOSIS — Z12.31 ENCOUNTER FOR SCREENING MAMMOGRAM FOR BREAST CANCER: ICD-10-CM

## 2025-04-23 PROCEDURE — 77063 BREAST TOMOSYNTHESIS BI: CPT | Performed by: RADIOLOGY

## 2025-04-23 PROCEDURE — 77063 BREAST TOMOSYNTHESIS BI: CPT

## 2025-04-23 PROCEDURE — 77067 SCR MAMMO BI INCL CAD: CPT | Performed by: RADIOLOGY

## 2025-04-28 DIAGNOSIS — I10 PRIMARY HYPERTENSION: ICD-10-CM

## 2025-04-28 RX ORDER — TRIAMTERENE AND HYDROCHLOROTHIAZIDE 37.5; 25 MG/1; MG/1
1 CAPSULE ORAL DAILY
Qty: 90 CAPSULE | Refills: 1 | Status: SHIPPED | OUTPATIENT
Start: 2025-04-28

## 2025-05-01 ENCOUNTER — APPOINTMENT (OUTPATIENT)
Dept: OBSTETRICS AND GYNECOLOGY | Facility: CLINIC | Age: 65
End: 2025-05-01
Payer: COMMERCIAL

## 2025-05-01 VITALS
SYSTOLIC BLOOD PRESSURE: 124 MMHG | DIASTOLIC BLOOD PRESSURE: 90 MMHG | WEIGHT: 171 LBS | HEIGHT: 63 IN | BODY MASS INDEX: 30.3 KG/M2

## 2025-05-01 DIAGNOSIS — I10 PRIMARY HYPERTENSION: ICD-10-CM

## 2025-05-01 DIAGNOSIS — N95.2 ATROPHIC VAGINITIS: ICD-10-CM

## 2025-05-01 DIAGNOSIS — Z01.419 WELL WOMAN EXAM WITH ROUTINE GYNECOLOGICAL EXAM: Primary | ICD-10-CM

## 2025-05-01 DIAGNOSIS — Z12.4 PAP SMEAR FOR CERVICAL CANCER SCREENING: ICD-10-CM

## 2025-05-01 DIAGNOSIS — R35.0 URINARY FREQUENCY: ICD-10-CM

## 2025-05-01 DIAGNOSIS — N89.8 VAGINAL LESION: ICD-10-CM

## 2025-05-01 LAB
POC APPEARANCE, URINE: CLEAR
POC BILIRUBIN, URINE: NEGATIVE
POC BLOOD, URINE: ABNORMAL
POC COLOR, URINE: ABNORMAL
POC GLUCOSE, URINE: NEGATIVE MG/DL
POC KETONES, URINE: NEGATIVE MG/DL
POC LEUKOCYTES, URINE: ABNORMAL
POC NITRITE,URINE: NEGATIVE
POC PH, URINE: 6 PH
POC PROTEIN, URINE: NEGATIVE MG/DL
POC SPECIFIC GRAVITY, URINE: 1.01
POC UROBILINOGEN, URINE: 0.2 EU/DL

## 2025-05-01 PROCEDURE — 87626 HPV SEP HI-RSK TYP&POOL RSLT: CPT

## 2025-05-01 PROCEDURE — 3074F SYST BP LT 130 MM HG: CPT | Performed by: OBSTETRICS & GYNECOLOGY

## 2025-05-01 PROCEDURE — 3080F DIAST BP >= 90 MM HG: CPT | Performed by: OBSTETRICS & GYNECOLOGY

## 2025-05-01 PROCEDURE — 1160F RVW MEDS BY RX/DR IN RCRD: CPT | Performed by: OBSTETRICS & GYNECOLOGY

## 2025-05-01 PROCEDURE — 1126F AMNT PAIN NOTED NONE PRSNT: CPT | Performed by: OBSTETRICS & GYNECOLOGY

## 2025-05-01 PROCEDURE — 1036F TOBACCO NON-USER: CPT | Performed by: OBSTETRICS & GYNECOLOGY

## 2025-05-01 PROCEDURE — 99397 PER PM REEVAL EST PAT 65+ YR: CPT | Performed by: OBSTETRICS & GYNECOLOGY

## 2025-05-01 PROCEDURE — 88175 CYTOPATH C/V AUTO FLUID REDO: CPT

## 2025-05-01 PROCEDURE — 81003 URINALYSIS AUTO W/O SCOPE: CPT | Performed by: OBSTETRICS & GYNECOLOGY

## 2025-05-01 PROCEDURE — 3008F BODY MASS INDEX DOCD: CPT | Performed by: OBSTETRICS & GYNECOLOGY

## 2025-05-01 PROCEDURE — 1159F MED LIST DOCD IN RCRD: CPT | Performed by: OBSTETRICS & GYNECOLOGY

## 2025-05-01 RX ORDER — AMLODIPINE AND BENAZEPRIL HYDROCHLORIDE 5; 10 MG/1; MG/1
1 CAPSULE ORAL DAILY
Qty: 90 CAPSULE | Refills: 1 | Status: SHIPPED | OUTPATIENT
Start: 2025-05-01

## 2025-05-01 RX ORDER — ESTRADIOL 0.1 MG/G
CREAM VAGINAL
Qty: 34 G | Refills: 3 | Status: SHIPPED | OUTPATIENT
Start: 2025-05-01

## 2025-05-01 ASSESSMENT — ENCOUNTER SYMPTOMS
PSYCHIATRIC NEGATIVE: 0
CARDIOVASCULAR NEGATIVE: 0
ALLERGIC/IMMUNOLOGIC NEGATIVE: 0
EYES NEGATIVE: 0
GASTROINTESTINAL NEGATIVE: 0
RESPIRATORY NEGATIVE: 0
HEMATOLOGIC/LYMPHATIC NEGATIVE: 0
CONSTITUTIONAL NEGATIVE: 0
MUSCULOSKELETAL NEGATIVE: 0
ENDOCRINE NEGATIVE: 0
NEUROLOGICAL NEGATIVE: 0

## 2025-05-01 ASSESSMENT — PAIN SCALES - GENERAL: PAINLEVEL_OUTOF10: 0-NO PAIN

## 2025-05-01 NOTE — PROGRESS NOTES
Subjective   Patient ID: Layla Delgado is a 64 y.o. female who presents for No chief complaint on file..  64 year old patient, for well woman exam.   Requesting UTI testing. Noticing irritation around urethra. Noticing some drainage. Using a Ce vaginal lotion and Replens.   Complaint of menopausal symptoms: hot flushes.   Does not feel like she goes to the bathroom as often as she should.   She is also taking lactobacilli and D-mannose supplements.   Not currently sexually active.   Menopausal for about 10 years.   Does use a vibrator periodically.       Review of Systems   All other systems reviewed and are negative.    Objective   Physical Exam  Vitals reviewed.   Constitutional:       Appearance: Normal appearance.   HENT:      Head: Normocephalic and atraumatic.      Right Ear: External ear normal.      Left Ear: External ear normal.      Nose: Nose normal.      Mouth/Throat:      Mouth: Mucous membranes are moist.   Eyes:      Extraocular Movements: Extraocular movements intact.   Neck:      Thyroid: No thyroid mass or thyromegaly.   Cardiovascular:      Rate and Rhythm: Normal rate and regular rhythm.      Heart sounds: Normal heart sounds.   Pulmonary:      Effort: Pulmonary effort is normal.      Breath sounds: Normal breath sounds.   Chest:   Breasts:     Right: Normal.      Left: Normal.   Abdominal:      General: Abdomen is flat. Bowel sounds are normal.      Palpations: Abdomen is soft.      Tenderness: There is no abdominal tenderness. There is no right CVA tenderness or left CVA tenderness.   Genitourinary:     General: Normal vulva.      Exam position: Lithotomy position.      Labia:         Right: No lesion.         Left: No lesion.       Urethra: No prolapse, urethral swelling or urethral lesion.      Vagina: Normal.      Cervix: Normal.      Uterus: Normal.       Adnexa: Right adnexa normal and left adnexa normal.          Comments: Lesions above urethra, ulcerated in appearance but  superficial.   Musculoskeletal:         General: Normal range of motion.      Right shoulder: Normal.      Left shoulder: Normal.      Cervical back: Normal, normal range of motion and neck supple.      Thoracic back: Normal.      Lumbar back: Normal.      Right hip: Normal.      Left hip: Normal.      Right upper leg: Normal.      Left upper leg: Normal.      Right knee: Normal.      Left knee: Normal.      Right lower leg: Normal.      Left lower leg: Normal.   Lymphadenopathy:      Upper Body:      Right upper body: No axillary adenopathy.      Left upper body: No axillary adenopathy.      Lower Body: No right inguinal adenopathy. No left inguinal adenopathy.   Skin:     General: Skin is warm and dry.   Neurological:      General: No focal deficit present.      Mental Status: She is alert and oriented to person, place, and time.      Cranial Nerves: Cranial nerves 2-12 are intact.      Motor: Motor function is intact.   Psychiatric:         Attention and Perception: Attention and perception normal.         Mood and Affect: Mood and affect normal.         Behavior: Behavior normal.         Cognition and Memory: Cognition normal.         Judgment: Judgment normal.     Assessment/Plan   Problem List Items Addressed This Visit    None  Visit Diagnoses         Codes      Well woman exam with routine gynecological exam    -  Primary Z01.419      Pap smear for cervical cancer screening     Z12.4    Relevant Orders    THINPREP PAP TEST (>30)      Urinary frequency     R35.0    Relevant Orders    POCT UA Automated manually resulted (Completed)      Atrophic vaginitis     N95.2    Relevant Medications    estradiol (Estrace) 0.01 % (0.1 mg/gram) vaginal cream      Vaginal lesion     N89.8    Relevant Orders    QUEST HSV, TYPE 1 AND 2 DNA, QL REAL TIME PCR             Audrey Marquez MD 05/01/25 1:53 PM

## 2025-05-02 ENCOUNTER — TELEPHONE (OUTPATIENT)
Dept: OBSTETRICS AND GYNECOLOGY | Facility: CLINIC | Age: 65
End: 2025-05-02
Payer: COMMERCIAL

## 2025-05-04 LAB
HSV1 DNA SPEC QL NAA+PROBE: NOT DETECTED
HSV2 DNA SPEC QL NAA+PROBE: NOT DETECTED
SPECIMEN SOURCE: NORMAL

## 2025-07-11 LAB — NONINV COLON CA DNA+OCC BLD SCRN STL QL: NORMAL

## 2025-07-24 LAB — NONINV COLON CA DNA+OCC BLD SCRN STL QL: NEGATIVE

## 2025-10-22 ENCOUNTER — APPOINTMENT (OUTPATIENT)
Dept: PRIMARY CARE | Facility: CLINIC | Age: 65
End: 2025-10-22
Payer: COMMERCIAL

## 2025-11-18 ENCOUNTER — APPOINTMENT (OUTPATIENT)
Dept: OPHTHALMOLOGY | Facility: CLINIC | Age: 65
End: 2025-11-18
Payer: COMMERCIAL

## 2026-01-07 ENCOUNTER — APPOINTMENT (OUTPATIENT)
Dept: PRIMARY CARE | Facility: CLINIC | Age: 66
End: 2026-01-07
Payer: COMMERCIAL